# Patient Record
Sex: MALE | Race: WHITE
[De-identification: names, ages, dates, MRNs, and addresses within clinical notes are randomized per-mention and may not be internally consistent; named-entity substitution may affect disease eponyms.]

---

## 2018-10-09 ENCOUNTER — HOSPITAL ENCOUNTER (OUTPATIENT)
Dept: HOSPITAL 80 - FIMAGING | Age: 63
End: 2018-10-09
Attending: GENERAL ACUTE CARE HOSPITAL
Payer: COMMERCIAL

## 2018-10-09 DIAGNOSIS — M84.88: ICD-10-CM

## 2018-10-09 DIAGNOSIS — Z87.81: ICD-10-CM

## 2018-10-09 DIAGNOSIS — M43.16: ICD-10-CM

## 2018-10-09 DIAGNOSIS — Z98.1: Primary | ICD-10-CM

## 2019-02-18 ENCOUNTER — HOSPITAL ENCOUNTER (EMERGENCY)
Dept: HOSPITAL 80 - FED | Age: 64
Discharge: HOME | End: 2019-02-18
Payer: COMMERCIAL

## 2019-02-18 VITALS — DIASTOLIC BLOOD PRESSURE: 95 MMHG | SYSTOLIC BLOOD PRESSURE: 138 MMHG

## 2019-02-18 DIAGNOSIS — I82.402: Primary | ICD-10-CM

## 2019-02-18 NOTE — EDPHY
H & P


Stated Complaint: L knee pain after fall friday


Time Seen by Provider: 02/18/19 12:02





- Medical/Surgical History


Hx Asthma: No


Hx Chronic Respiratory Disease: No


Hx Diabetes: No


Hx Cardiac Disease: Yes


Hx Renal Disease: No


Hx Cirrhosis: No


Hx Alcoholism: No


Hx HIV/AIDS: No


Hx Splenectomy or Spleen Trauma: No


Other PMH: Spinal decrompression surgery 2014. Fusion L5-S1 - 2016. Chronic 

headaches. MI 2011.





- Social History


Smoking Status: Never smoked


Constitutional: 


 Initial Vital Signs











Temperature (C)  36.6 C   02/18/19 11:57


 


Heart Rate  80   02/18/19 11:57


 


Respiratory Rate  16   02/18/19 11:57


 


Blood Pressure  138/95 H  02/18/19 11:57


 


O2 Sat (%)  96   02/18/19 11:57








 











O2 Delivery Mode               Room Air














Allergies/Adverse Reactions: 


 





Iodinated Contrast- Oral and IV Dye [IV Dye, Iodine Containing] Allergy (Severe

, Verified 02/18/19 11:54)


 








Home Medications: 














 Medication  Instructions  Recorded


 


Asa Unk Dose  07/08/13


 


Crestor Unk Dose  07/08/13


 


Nitro Unk Dose  07/08/13


 


Plavix Unk Dose  07/08/13


 


methylPREDNISolone [Medrol Dose 1 each PO AD #1 ea 07/16/18





Jeremy]  


 


oxyCODONE IR [Oxycodone Ir (*)] 5 - 10 mg PO Q6 PRN #20 tab 07/16/18


 


Rivaroxaban [Xarelto 15mg (*)] 15 mg PO BID #42 tab 02/18/19


 


Rivaroxaban [Xarelto] 20 mg PO DAILY #67 tab 02/18/19














Medical Decision Making





- Diagnostics


Imaging Results: 


 Imaging Impressions





Extremity Venous Study  02/18/19 12:04


Impression: Partial nonocclusive deep venous thrombosis involving the left 

femoral vein, left popliteal vein, and left posterior tibial vein which given 

the patient's history probably represents acute on chronic deep venous 

thrombosis.


 


Findings and recommendations discussed with Emergency Department physician, 

Bruce Rust MD at 12:50 hours, 2/18/2019.


 


Final report concurs with initial preliminary interpretation.











Imaging: Discussed imaging studies w/ On call Radiologist, I viewed and 

interpreted images myself


ED Course/Re-evaluation: 





CHIEF COMPLAINT:  Left knee injury





HISTORY OF PRESENT ILLNESS:  


The patient is a 64 y/o with a history of a degenerative lower back and DVT s/p 

lumbar surgery complaining of a left knee injury. Since the DVT his left leg 

has been 1 inch larger than his right. On Friday, 3 days ago, he fell an hit 

his left knee upon impact. Since the fall he has had pain with extension and 

flexion of the left knee. He also noticed that his left knee is more swollen 

which has not been alleviated with rest, ice and elevation. He is concerned 

that he has another DVT. No fever, headache, body aches, lightheadedness, chest 

pain, heart palpitations, shortness of breath, cough, abdominal pain, urinary 

or bowel complaints, numbness, paresthesias.





REVIEW OF SYSTEMS:  





A 10 point review of systems was performed and is negative with the exception 

of the elements mentioned in the history of present illness.





PHYSICAL EXAM:  





HR, BP, O2 Sat, RR.  Temp noted


General Appearance:  Alert, well hydrated, appropriate, and non-toxic appearing.


Head:  Atraumatic without scalp tenderness or obvious injury


Eyes:  Pupils equal, round, reactive to light and accommodation, EOMI, no trauma

, no injection.


Ears:  Clear bilaterally, no perforation, normal landmarks


Nose:  Atraumatic, no rhinorrhea, clear.


Throat:  There is no erythema or exudates, no lesions, normal tonsils, mucus 

membranes moist.


Neck:  Supple, 2+ carotid upstroke, nontender, no lymphadenopathy.


Respiratory:  No retractions, no distress, no wheezes, and no accessory muscle 

use.  Lungs are clear to auscultation bilaterally.


Cardiovascular:  Regular rate and rhythm, no murmurs, rubs, or gallops. 

Bilateral carotid, radial, dorsalis pedis, and posterior tibial pulses intact. 

Good capillary refill all extremities.


Gastrointestinal:  Abdomen is soft, nontender, non-distended, no masses, no 

rebound, no guarding, no peritoneal signs.


Musculoskeletal: Left knee swelling, decreased ROM, baker's cyst present.  

Otherwise normal active ROM of all extremities, atraumatic.


Neurological:  Alert, appropriate, and interactive.  The patient has normal 

DTRs and non-focal cranial nerves, motor, sensory, and cerebellar exam.


Skin:  No rashes, good turgor, no nodules on palpation.





Past medical history:  Degenerative lower back, DVT


Past surgical history:  2 surgeries including fusion lumbar spine


Family history:  Noncontributory


Social history:  Single, employed, does not abuse tobacco drugs or alcohol





DIAGNOSTICS/PROCEDURES/CRITICAL CARE TIME:  





Left knee x-ray: No acute osseous findings.





Left lower extremity US: Acute on chronic left lower extremity DVT





DIFFERENTIAL DIAGNOSIS:   


The differential diagnosis for the patient's knee injury included but was not 

limited to fracture, ligamentous injury, contusion, muscular strain, and 

meniscus injury.





MEDICAL DECISION MAKING:  


The patient is a 64 y/o with a history of a degenerative lower back and DVT s/p 

lumbar surgery complaining of a left knee injury 3 days ago. On exam he has 

left knee internal derangement. Left knee x-ray and lower extremity US ordered.





1248: I spoke with Dr. Trammell, radiologist, regarding patient's lower extremity 

US. Patient has an acute on chronic left lower extremity DVT. We will need to 

anticoagulate the patient with Xarelto.





1259: I reviewed patient's left knee x-ray which reveals no acute osseous 

findings.





1307: Reassessed patient and discussed imaging findings. I have discussed plan 

for Xarelto which he is comfortable with. Return precautions provided; patient 

is comfortable with this plan.








Departure





- Departure


Disposition: Home, Routine, Self-Care


Clinical Impression: 


Left leg DVT


Qualifiers:


 Affected thrombotic vein of extremity: unspecified vein of extremity Chronicity

: acute Qualified Code(s): I82.402 - Acute embolism and thrombosis of 

unspecified deep veins of left lower extremity





Condition: Good


Instructions:  Deep Vein Thrombosis (ED), Deep Vein Thrombosis Prevention (ED)


Additional Instructions: 


1. You have a acute on chronic left lower extremity DVT.


2. Take Xarelto as prescribed.


3. Return to the emergency department for worsening pain, swelling, numbness, 

weakness, chest pain, shortness of breath or other concerns.





Referrals: 


Maxi Bonilla MD [Primary Care Provider] - As per Instructions


Prescriptions: 


Rivaroxaban [Xarelto 15mg (*)] 15 mg PO BID #42 tab


Rivaroxaban [Xarelto] 20 mg PO DAILY #67 tab


Report Scribed for: Bruce Rust


Report Scribed by: Anayeli Klein


Date of Report: 02/18/19


Time of Report: 12:06